# Patient Record
Sex: FEMALE | Race: WHITE | NOT HISPANIC OR LATINO | Employment: UNEMPLOYED | URBAN - METROPOLITAN AREA
[De-identification: names, ages, dates, MRNs, and addresses within clinical notes are randomized per-mention and may not be internally consistent; named-entity substitution may affect disease eponyms.]

---

## 2023-07-25 ENCOUNTER — HOSPITAL ENCOUNTER (EMERGENCY)
Facility: HOSPITAL | Age: 18
Discharge: HOME | End: 2023-07-25
Attending: EMERGENCY MEDICINE | Admitting: EMERGENCY MEDICINE
Payer: MEDICAID

## 2023-07-25 ENCOUNTER — APPOINTMENT (OUTPATIENT)
Dept: CT IMAGING | Facility: HOSPITAL | Age: 18
End: 2023-07-25
Payer: MEDICAID

## 2023-07-25 VITALS
HEIGHT: 66 IN | RESPIRATION RATE: 17 BRPM | DIASTOLIC BLOOD PRESSURE: 79 MMHG | OXYGEN SATURATION: 98 % | HEART RATE: 86 BPM | TEMPERATURE: 98.5 F | BODY MASS INDEX: 29.62 KG/M2 | WEIGHT: 184.3 LBS | SYSTOLIC BLOOD PRESSURE: 134 MMHG

## 2023-07-25 DIAGNOSIS — R10.30 LOWER ABDOMINAL PAIN: Primary | ICD-10-CM

## 2023-07-25 LAB
ALBUMIN SERPL-MCNC: 4.1 G/DL (ref 3.5–5.2)
ALBUMIN/GLOB SERPL: 1.5 G/DL
ALP SERPL-CCNC: 69 U/L (ref 39–117)
ALT SERPL W P-5'-P-CCNC: 7 U/L (ref 1–33)
ANION GAP SERPL CALCULATED.3IONS-SCNC: 10 MMOL/L (ref 5–15)
AST SERPL-CCNC: 11 U/L (ref 1–32)
BACTERIA UR QL AUTO: ABNORMAL /HPF
BASOPHILS # BLD AUTO: 0 10*3/MM3 (ref 0–0.2)
BASOPHILS NFR BLD AUTO: 0.4 % (ref 0–1.5)
BILIRUB SERPL-MCNC: 0.9 MG/DL (ref 0–1.2)
BILIRUB UR QL STRIP: NEGATIVE
BUN SERPL-MCNC: 9 MG/DL (ref 6–20)
BUN/CREAT SERPL: 10.6 (ref 7–25)
CALCIUM SPEC-SCNC: 9 MG/DL (ref 8.6–10.5)
CHLORIDE SERPL-SCNC: 106 MMOL/L (ref 98–107)
CLARITY UR: CLEAR
CO2 SERPL-SCNC: 22 MMOL/L (ref 22–29)
COLOR UR: YELLOW
CREAT SERPL-MCNC: 0.85 MG/DL (ref 0.57–1)
DEPRECATED RDW RBC AUTO: 37.2 FL (ref 37–54)
EGFRCR SERPLBLD CKD-EPI 2021: 101.4 ML/MIN/1.73
EOSINOPHIL # BLD AUTO: 0.1 10*3/MM3 (ref 0–0.4)
EOSINOPHIL NFR BLD AUTO: 0.7 % (ref 0.3–6.2)
ERYTHROCYTE [DISTWIDTH] IN BLOOD BY AUTOMATED COUNT: 12.1 % (ref 12.3–15.4)
GLOBULIN UR ELPH-MCNC: 2.7 GM/DL
GLUCOSE SERPL-MCNC: 96 MG/DL (ref 65–99)
GLUCOSE UR STRIP-MCNC: NEGATIVE MG/DL
HCG SERPL QL: NEGATIVE
HCT VFR BLD AUTO: 42.1 % (ref 34–46.6)
HGB BLD-MCNC: 14 G/DL (ref 12–15.9)
HGB UR QL STRIP.AUTO: NEGATIVE
HYALINE CASTS UR QL AUTO: ABNORMAL /LPF
KETONES UR QL STRIP: NEGATIVE
LEUKOCYTE ESTERASE UR QL STRIP.AUTO: ABNORMAL
LIPASE SERPL-CCNC: 15 U/L (ref 13–60)
LYMPHOCYTES # BLD AUTO: 1.6 10*3/MM3 (ref 0.7–3.1)
LYMPHOCYTES NFR BLD AUTO: 18 % (ref 19.6–45.3)
MCH RBC QN AUTO: 29.5 PG (ref 26.6–33)
MCHC RBC AUTO-ENTMCNC: 33.3 G/DL (ref 31.5–35.7)
MCV RBC AUTO: 88.5 FL (ref 79–97)
MONOCYTES # BLD AUTO: 0.5 10*3/MM3 (ref 0.1–0.9)
MONOCYTES NFR BLD AUTO: 5.9 % (ref 5–12)
NEUTROPHILS NFR BLD AUTO: 6.8 10*3/MM3 (ref 1.7–7)
NEUTROPHILS NFR BLD AUTO: 75 % (ref 42.7–76)
NITRITE UR QL STRIP: NEGATIVE
NRBC BLD AUTO-RTO: 0.1 /100 WBC (ref 0–0.2)
PH UR STRIP.AUTO: 6 [PH] (ref 5–8)
PLATELET # BLD AUTO: 331 10*3/MM3 (ref 140–450)
PMV BLD AUTO: 8.1 FL (ref 6–12)
POTASSIUM SERPL-SCNC: 4.1 MMOL/L (ref 3.5–5.2)
PROT SERPL-MCNC: 6.8 G/DL (ref 6–8.5)
PROT UR QL STRIP: NEGATIVE
RBC # BLD AUTO: 4.76 10*6/MM3 (ref 3.77–5.28)
RBC # UR STRIP: ABNORMAL /HPF
REF LAB TEST METHOD: ABNORMAL
SODIUM SERPL-SCNC: 138 MMOL/L (ref 136–145)
SP GR UR STRIP: 1.01 (ref 1–1.03)
SQUAMOUS #/AREA URNS HPF: ABNORMAL /HPF
UROBILINOGEN UR QL STRIP: ABNORMAL
WBC # UR STRIP: ABNORMAL /HPF
WBC NRBC COR # BLD: 9.1 10*3/MM3 (ref 3.4–10.8)

## 2023-07-25 PROCEDURE — 84703 CHORIONIC GONADOTROPIN ASSAY: CPT | Performed by: PHYSICIAN ASSISTANT

## 2023-07-25 PROCEDURE — 83690 ASSAY OF LIPASE: CPT | Performed by: PHYSICIAN ASSISTANT

## 2023-07-25 PROCEDURE — 25010000002 ONDANSETRON PER 1 MG: Performed by: PHYSICIAN ASSISTANT

## 2023-07-25 PROCEDURE — 85025 COMPLETE CBC W/AUTO DIFF WBC: CPT | Performed by: PHYSICIAN ASSISTANT

## 2023-07-25 PROCEDURE — 80053 COMPREHEN METABOLIC PANEL: CPT | Performed by: PHYSICIAN ASSISTANT

## 2023-07-25 PROCEDURE — 25510000001 IOPAMIDOL PER 1 ML: Performed by: EMERGENCY MEDICINE

## 2023-07-25 PROCEDURE — 99283 EMERGENCY DEPT VISIT LOW MDM: CPT

## 2023-07-25 PROCEDURE — 25010000002 KETOROLAC TROMETHAMINE PER 15 MG: Performed by: PHYSICIAN ASSISTANT

## 2023-07-25 PROCEDURE — 96374 THER/PROPH/DIAG INJ IV PUSH: CPT

## 2023-07-25 PROCEDURE — 96375 TX/PRO/DX INJ NEW DRUG ADDON: CPT

## 2023-07-25 PROCEDURE — 81001 URINALYSIS AUTO W/SCOPE: CPT | Performed by: PHYSICIAN ASSISTANT

## 2023-07-25 PROCEDURE — 74177 CT ABD & PELVIS W/CONTRAST: CPT

## 2023-07-25 RX ORDER — ONDANSETRON 2 MG/ML
4 INJECTION INTRAMUSCULAR; INTRAVENOUS ONCE
Status: COMPLETED | OUTPATIENT
Start: 2023-07-25 | End: 2023-07-25

## 2023-07-25 RX ORDER — KETOROLAC TROMETHAMINE 30 MG/ML
30 INJECTION, SOLUTION INTRAMUSCULAR; INTRAVENOUS ONCE
Status: COMPLETED | OUTPATIENT
Start: 2023-07-25 | End: 2023-07-25

## 2023-07-25 RX ORDER — SODIUM CHLORIDE 0.9 % (FLUSH) 0.9 %
10 SYRINGE (ML) INJECTION AS NEEDED
Status: DISCONTINUED | OUTPATIENT
Start: 2023-07-25 | End: 2023-07-26 | Stop reason: HOSPADM

## 2023-07-25 RX ADMIN — KETOROLAC TROMETHAMINE 30 MG: 30 INJECTION, SOLUTION INTRAMUSCULAR at 19:47

## 2023-07-25 RX ADMIN — ONDANSETRON 4 MG: 2 INJECTION INTRAMUSCULAR; INTRAVENOUS at 19:47

## 2023-07-25 RX ADMIN — IOPAMIDOL 100 ML: 755 INJECTION, SOLUTION INTRAVENOUS at 21:55

## 2023-07-25 NOTE — Clinical Note
[facility] EMERGENCY DEPARTMENT  [address] [facility] [address] [facility] [place] [address]  Phone: [phone]    Trish Naqvi was seen and treated in our emergency department on 7/25/2023.  She may return to work on 07/26/2023.         Thank you for choosing [facility].    Bentley Salazar PA

## 2023-07-25 NOTE — ED PROVIDER NOTES
Subjective   History of Present Illness    Patient is a 17-year-old female comes in complaining of pelvic pain for the past 4 months.  Patient states the pain will come and go but sometimes will persist as a dull ache.  Patient states other times she will have waves of sharp pain.  Patient states is in the lower front of her abdomen.  Patient states that pain currently is mild about a 2 out of 10 is nonradiating and nothing seems to make it better or worse.  Patient states she is not currently sexually active and does not take birth control.  Patient states her last menstrual cycle was very light around June 12, 2023.  Patient denies any vaginal bleeding or vaginal discharge and denies any urinary frequency or pain with urination.  Patient states she has seen her primary care and urgent care and they do not know what is causing patient's issue however patient has not seen a gynecologist.  Patient denies any nausea, vomiting, diarrhea, fever, chills.      Review of Systems   Constitutional:  Negative for chills, fatigue and fever.   HENT:  Negative for congestion, sore throat, tinnitus and trouble swallowing.    Eyes:  Negative for photophobia, discharge and visual disturbance.   Respiratory:  Negative for cough and shortness of breath.    Cardiovascular:  Negative for chest pain and leg swelling.   Gastrointestinal:  Negative for blood in stool, diarrhea, nausea and vomiting.   Genitourinary:  Positive for pelvic pain. Negative for dysuria, flank pain and urgency.   Musculoskeletal:  Negative for arthralgias and myalgias.   Skin:  Negative for rash.   Neurological:  Negative for dizziness and headaches.   Psychiatric/Behavioral:  Negative for confusion.      History reviewed. No pertinent past medical history.    No Known Allergies    History reviewed. No pertinent surgical history.    History reviewed. No pertinent family history.    Social History     Socioeconomic History    Marital status: Single            Objective   Physical Exam  Vitals and nursing note reviewed.   Constitutional:       General: She is not in acute distress.     Appearance: She is well-developed. She is not diaphoretic.   HENT:      Head: Normocephalic and atraumatic.      Right Ear: External ear normal.      Left Ear: External ear normal.      Nose: Nose normal.      Mouth/Throat:      Mouth: Mucous membranes are moist.   Eyes:      Extraocular Movements: Extraocular movements intact.      Conjunctiva/sclera: Conjunctivae normal.      Pupils: Pupils are equal, round, and reactive to light.   Cardiovascular:      Rate and Rhythm: Normal rate and regular rhythm.      Pulses: Normal pulses.      Heart sounds: Normal heart sounds.      Comments: S1, S2 audible.  Pulmonary:      Effort: Pulmonary effort is normal. No respiratory distress.      Breath sounds: Normal breath sounds. No wheezing, rhonchi or rales.      Comments: On room air.  Abdominal:      General: Bowel sounds are normal. There is no distension.      Palpations: Abdomen is soft.      Tenderness: There is no abdominal tenderness. There is no guarding or rebound.   Musculoskeletal:         General: No tenderness or deformity. Normal range of motion.      Cervical back: Normal range of motion.   Skin:     General: Skin is warm.      Capillary Refill: Capillary refill takes less than 2 seconds.      Findings: No erythema or rash.   Neurological:      General: No focal deficit present.      Mental Status: She is alert and oriented to person, place, and time.      Cranial Nerves: No cranial nerve deficit.   Psychiatric:         Mood and Affect: Mood normal.         Behavior: Behavior normal.       Procedures           ED Course  ED Course as of 07/26/23 0526   Tue Jul 25, 2023 2046 Awaiting ct [RL]   2143 Awaiting ct [RL]   2243 Awaiting ct [RL]      ED Course User Index  [RL] Bentley Salazar PA      Labs Reviewed   URINALYSIS W/ CULTURE IF INDICATED - Abnormal; Notable for the  following components:       Result Value    Leuk Esterase, UA Trace (*)     All other components within normal limits    Narrative:     In absence of clinical symptoms, the presence of pyuria, bacteria, and/or nitrites on the urinalysis result does not correlate with infection.   CBC WITH AUTO DIFFERENTIAL - Abnormal; Notable for the following components:    RDW 12.1 (*)     Lymphocyte % 18.0 (*)     All other components within normal limits   URINALYSIS, MICROSCOPIC ONLY - Abnormal; Notable for the following components:    RBC, UA 0-2 (*)     WBC, UA 0-2 (*)     Bacteria, UA Trace (*)     Squamous Epithelial Cells, UA 3-6 (*)     All other components within normal limits   LIPASE - Normal   HCG, SERUM, QUALITATIVE - Normal   COMPREHENSIVE METABOLIC PANEL    Narrative:     GFR Normal >60  Chronic Kidney Disease <60  Kidney Failure <15     CBC AND DIFFERENTIAL    Narrative:     The following orders were created for panel order CBC & Differential.  Procedure                               Abnormality         Status                     ---------                               -----------         ------                     CBC Auto Differential[[id number]]        Abnormal            Final result                 Please view results for these tests on the individual orders.                                          Medical Decision Making  Problems Addressed:  Lower abdominal pain: complicated acute illness or injury    Amount and/or Complexity of Data Reviewed  Labs: ordered.  Radiology: ordered.    Risk  Prescription drug management.      Differential diagnosis: UTI, ovarian cyst, IBS, pregnancy, not meant to be an all-inclusive list  Chart review: No prior notes available to review at this time.    EKG:  not indicated  Imaging:    CT Abdomen Pelvis With Contrast   Final Result   1.Urinary bladder wall thickening may represent under distention, cystitis or other etiologies. Please correlate with urinalysis. Follow-up  "recommended.                           Electronically Signed: Antoine Dunlap     7/25/2023 10:46 PM EDT     Workstation ID: [id number]        Labs: Lab work independently interpreted by myself shows CBC and CMP largely unremarkable for acute findings, lipase normal, serum hCG negative UA shows trace bacteria otherwise largely unremarkable UA.    Vitals:  /79   Pulse 86   Temp 98.5 °F (36.9 °C) (Oral)   Resp 17   Ht 167.6 cm (66\")   Wt 83.6 kg (184 lb 4.9 oz)   LMP 06/12/2023 Comment: Pt reports irregular periods.  SpO2 98%   BMI 29.75 kg/m²    Medications given:    Medications   ondansetron (ZOFRAN) injection 4 mg (4 mg Intravenous Given 7/25/23 1947)   ketorolac (TORADOL) injection 30 mg (30 mg Intravenous Given 7/25/23 1947)   iopamidol (ISOVUE-370) 76 % injection 100 mL (100 mL Intravenous Given 7/25/23 2155)       Procedures:  not indicated  MDM: Patient is a 17-year-old female comes in complaining of pelvic abdominal pain that will come and go.  Patient had nonsurgical, nontender abdomen on exam.  Patient was given Toradol and Zofran with minimal relief of symptoms.  CT abdomen pelvis unremarkable for acute findings as described above other than possible cystitis.  However patient denies any urinary symptoms and UA does not correlate with UTI.  I discussed with patient and mother at bedside that patient may have ovarian cysts that come and go and giving her discomfort however treatment will be the same of Tylenol and ibuprofen and follow-up with gynecology and patient voiced understanding.  I do not believe patient to have ovarian torsion as patient has minimal pain at this time and again a nontender abdomen.  I did discuss with patient and mother at bedside regarding pelvic exam however patient denies any vaginal bleeding or vaginal discharge and shared decision making would like to defer this test.  See full discharge instructions for further details.  Results and plan discussed with patient and is " agreeable with plan.      Final diagnoses:   Lower abdominal pain       ED Disposition  ED Disposition       ED Disposition   Discharge    Condition   Stable    Comment   --               [facility] EMERGENCY DEPARTMENT  [address] [facility] [address] [phone]  Go in 1 day  As needed, If symptoms worsen    Katy Alvarado, APRN  [address]  [facility] [address]  [place] [address]  [phone]    Call in 1 week  As needed    Zabrina Walker MD  [address] [place] [address]  [phone]    Schedule an appointment as soon as possible for a visit in 1 week  for GYN follow up         Medication List      No changes were made to your prescriptions during this visit.            Bentley Salazar PA  07/26/23 0575

## 2023-07-26 NOTE — DISCHARGE INSTRUCTIONS
Please take Tylenol and ibuprofen as needed for pain control.  Please call OB/GYN, Dr. Walker above to follow-up in 1 week's time regarding your symptoms.  Please come back to the ER if you spike a high fever or have vomiting as you will need reevaluation at that time.